# Patient Record
Sex: MALE | Race: OTHER | URBAN - METROPOLITAN AREA
[De-identification: names, ages, dates, MRNs, and addresses within clinical notes are randomized per-mention and may not be internally consistent; named-entity substitution may affect disease eponyms.]

---

## 2022-04-05 ENCOUNTER — CONSULTATION/EVALUATION (OUTPATIENT)
Dept: URBAN - METROPOLITAN AREA CLINIC 14 | Facility: CLINIC | Age: 52
End: 2022-04-05

## 2022-04-05 DIAGNOSIS — H52.7: ICD-10-CM

## 2022-04-05 DIAGNOSIS — H25.13: ICD-10-CM

## 2022-04-05 PROCEDURE — 99499LKFN LASIK CONSULT NON CANDIDATE

## 2022-04-05 ASSESSMENT — KERATOMETRY
OD_AXISANGLE_DEGREES: 2
OS_K1POWER_DIOPTERS: 43.50
OD_K1POWER_DIOPTERS: 43.50
OS_AXISANGLE_DEGREES: 165
OS_K2POWER_DIOPTERS: 44.50
OS_AXISANGLE2_DEGREES: 75
OD_K2POWER_DIOPTERS: 44.25
OD_AXISANGLE2_DEGREES: 92

## 2022-04-05 ASSESSMENT — VISUAL ACUITY
OS_SC: 20/200
OD_SC: 20/40

## 2022-04-05 NOTE — PATIENT DISCUSSION
The patient feels that the cataract is significantly impacting daily activities and has elected cataract surgery. The risks, benefits, and alternatives to surgery were discussed. PATIENT WILL CONSIDER RLE AND WILL LET US KNOW WHEN READY.